# Patient Record
Sex: FEMALE | Race: WHITE | NOT HISPANIC OR LATINO | Employment: OTHER | URBAN - NONMETROPOLITAN AREA
[De-identification: names, ages, dates, MRNs, and addresses within clinical notes are randomized per-mention and may not be internally consistent; named-entity substitution may affect disease eponyms.]

---

## 2024-02-21 ENCOUNTER — HOSPITAL ENCOUNTER (INPATIENT)
Facility: HOSPITAL | Age: 79
LOS: 1 days | Discharge: HOME | DRG: 305 | End: 2024-02-22
Attending: EMERGENCY MEDICINE | Admitting: FAMILY MEDICINE
Payer: COMMERCIAL

## 2024-02-21 ENCOUNTER — APPOINTMENT (EMERGENCY)
Dept: RADIOLOGY | Facility: HOSPITAL | Age: 79
DRG: 305 | End: 2024-02-21
Payer: COMMERCIAL

## 2024-02-21 DIAGNOSIS — I20.0 UNSTABLE ANGINA (HCC): ICD-10-CM

## 2024-02-21 DIAGNOSIS — R07.9 CHEST PAIN: Primary | ICD-10-CM

## 2024-02-21 DIAGNOSIS — I44.7 LEFT BUNDLE BRANCH BLOCK: ICD-10-CM

## 2024-02-21 PROBLEM — I16.9 HYPERTENSIVE CRISIS: Status: ACTIVE | Noted: 2024-02-21

## 2024-02-21 LAB
2HR DELTA HS TROPONIN: -1 NG/L
4HR DELTA HS TROPONIN: 0 NG/L
ALBUMIN SERPL BCP-MCNC: 4.3 G/DL (ref 3.5–5)
ALP SERPL-CCNC: 111 U/L (ref 34–104)
ALT SERPL W P-5'-P-CCNC: 16 U/L (ref 7–52)
ANION GAP SERPL CALCULATED.3IONS-SCNC: 12 MMOL/L
AST SERPL W P-5'-P-CCNC: 20 U/L (ref 13–39)
BASOPHILS # BLD AUTO: 0.03 THOUSANDS/ÂΜL (ref 0–0.1)
BASOPHILS NFR BLD AUTO: 0 % (ref 0–1)
BILIRUB SERPL-MCNC: 0.57 MG/DL (ref 0.2–1)
BUN SERPL-MCNC: 23 MG/DL (ref 5–25)
CALCIUM SERPL-MCNC: 9.5 MG/DL (ref 8.4–10.2)
CARDIAC TROPONIN I PNL SERPL HS: 4 NG/L
CARDIAC TROPONIN I PNL SERPL HS: 5 NG/L
CARDIAC TROPONIN I PNL SERPL HS: 5 NG/L
CHLORIDE SERPL-SCNC: 104 MMOL/L (ref 96–108)
CO2 SERPL-SCNC: 24 MMOL/L (ref 21–32)
CREAT SERPL-MCNC: 1.03 MG/DL (ref 0.6–1.3)
EOSINOPHIL # BLD AUTO: 0.16 THOUSAND/ÂΜL (ref 0–0.61)
EOSINOPHIL NFR BLD AUTO: 2 % (ref 0–6)
ERYTHROCYTE [DISTWIDTH] IN BLOOD BY AUTOMATED COUNT: 14.1 % (ref 11.6–15.1)
GFR SERPL CREATININE-BSD FRML MDRD: 52 ML/MIN/1.73SQ M
GLUCOSE SERPL-MCNC: 103 MG/DL (ref 65–140)
HCT VFR BLD AUTO: 42.1 % (ref 34.8–46.1)
HGB BLD-MCNC: 13.8 G/DL (ref 11.5–15.4)
IMM GRANULOCYTES # BLD AUTO: 0.03 THOUSAND/UL (ref 0–0.2)
IMM GRANULOCYTES NFR BLD AUTO: 0 % (ref 0–2)
LIPASE SERPL-CCNC: 25 U/L (ref 11–82)
LYMPHOCYTES # BLD AUTO: 1.76 THOUSANDS/ÂΜL (ref 0.6–4.47)
LYMPHOCYTES NFR BLD AUTO: 22 % (ref 14–44)
MCH RBC QN AUTO: 28.3 PG (ref 26.8–34.3)
MCHC RBC AUTO-ENTMCNC: 32.8 G/DL (ref 31.4–37.4)
MCV RBC AUTO: 86 FL (ref 82–98)
MONOCYTES # BLD AUTO: 0.52 THOUSAND/ÂΜL (ref 0.17–1.22)
MONOCYTES NFR BLD AUTO: 7 % (ref 4–12)
NEUTROPHILS # BLD AUTO: 5.37 THOUSANDS/ÂΜL (ref 1.85–7.62)
NEUTS SEG NFR BLD AUTO: 69 % (ref 43–75)
NRBC BLD AUTO-RTO: 0 /100 WBCS
PLATELET # BLD AUTO: 285 THOUSANDS/UL (ref 149–390)
PMV BLD AUTO: 11 FL (ref 8.9–12.7)
POTASSIUM SERPL-SCNC: 4.1 MMOL/L (ref 3.5–5.3)
PROT SERPL-MCNC: 7.7 G/DL (ref 6.4–8.4)
RBC # BLD AUTO: 4.88 MILLION/UL (ref 3.81–5.12)
SODIUM SERPL-SCNC: 140 MMOL/L (ref 135–147)
WBC # BLD AUTO: 7.87 THOUSAND/UL (ref 4.31–10.16)

## 2024-02-21 PROCEDURE — 99223 1ST HOSP IP/OBS HIGH 75: CPT | Performed by: NURSE PRACTITIONER

## 2024-02-21 PROCEDURE — 99285 EMERGENCY DEPT VISIT HI MDM: CPT | Performed by: EMERGENCY MEDICINE

## 2024-02-21 PROCEDURE — 99285 EMERGENCY DEPT VISIT HI MDM: CPT

## 2024-02-21 PROCEDURE — 71045 X-RAY EXAM CHEST 1 VIEW: CPT

## 2024-02-21 PROCEDURE — 83690 ASSAY OF LIPASE: CPT | Performed by: NURSE PRACTITIONER

## 2024-02-21 PROCEDURE — 84484 ASSAY OF TROPONIN QUANT: CPT | Performed by: EMERGENCY MEDICINE

## 2024-02-21 PROCEDURE — 85025 COMPLETE CBC W/AUTO DIFF WBC: CPT | Performed by: EMERGENCY MEDICINE

## 2024-02-21 PROCEDURE — 36415 COLL VENOUS BLD VENIPUNCTURE: CPT | Performed by: EMERGENCY MEDICINE

## 2024-02-21 PROCEDURE — 93005 ELECTROCARDIOGRAM TRACING: CPT

## 2024-02-21 PROCEDURE — 80053 COMPREHEN METABOLIC PANEL: CPT | Performed by: EMERGENCY MEDICINE

## 2024-02-21 RX ORDER — NITROGLYCERIN 0.4 MG/1
0.4 TABLET SUBLINGUAL ONCE
Status: COMPLETED | OUTPATIENT
Start: 2024-02-21 | End: 2024-02-21

## 2024-02-21 RX ORDER — CYANOCOBALAMIN (VITAMIN B-12) 5000 MCG
1 TABLET,DISINTEGRATING ORAL DAILY
COMMUNITY

## 2024-02-21 RX ORDER — ASPIRIN 81 MG/1
324 TABLET, CHEWABLE ORAL ONCE
Status: COMPLETED | OUTPATIENT
Start: 2024-02-21 | End: 2024-02-21

## 2024-02-21 RX ORDER — HYDRALAZINE HYDROCHLORIDE 20 MG/ML
5 INJECTION INTRAMUSCULAR; INTRAVENOUS EVERY 6 HOURS PRN
Status: DISCONTINUED | OUTPATIENT
Start: 2024-02-21 | End: 2024-02-22 | Stop reason: HOSPADM

## 2024-02-21 RX ORDER — MULTIVIT-MIN/IRON/FOLIC ACID/K 18-600-40
1 CAPSULE ORAL DAILY
COMMUNITY

## 2024-02-21 RX ORDER — ASPIRIN 81 MG/1
81 TABLET, CHEWABLE ORAL DAILY
Status: DISCONTINUED | OUTPATIENT
Start: 2024-02-22 | End: 2024-02-22 | Stop reason: HOSPADM

## 2024-02-21 RX ORDER — ACETAMINOPHEN 325 MG/1
650 TABLET ORAL EVERY 6 HOURS PRN
Status: DISCONTINUED | OUTPATIENT
Start: 2024-02-21 | End: 2024-02-22 | Stop reason: HOSPADM

## 2024-02-21 RX ORDER — ENOXAPARIN SODIUM 100 MG/ML
40 INJECTION SUBCUTANEOUS DAILY
Status: DISCONTINUED | OUTPATIENT
Start: 2024-02-22 | End: 2024-02-22 | Stop reason: HOSPADM

## 2024-02-21 RX ADMIN — NITROGLYCERIN 0.4 MG: 0.4 TABLET SUBLINGUAL at 15:44

## 2024-02-21 RX ADMIN — ASPIRIN 81 MG CHEWABLE TABLET 324 MG: 81 TABLET CHEWABLE at 15:43

## 2024-02-21 NOTE — ED PROVIDER NOTES
History  Chief Complaint   Patient presents with    Chest Pain     Patient states she has been having palpitations, chest discomfort for one week. Was at PCP and instructed to come to ED for further evaluation of abnormal EKG.      Patient is a 78-year-old female with no history of diabetes, hypercholesterolemia or hypertension.  No smoking history.  Presenting to the emergency room with chief complaint of having about 1 week worth of chest discomfort.  Radiates occasionally to her neck and her left arm.  Sometimes radiates to her back.  Last for about 10 to 15 minutes and resolved spontaneously.  Mostly comes on at rest.  Does not initially come on with activity.  Patient has been active on a treadmill for some time and recently walked on the treadmill for about 15 minutes a day without any symptomatology.    Patient was following up with her primary care provider today and had an EKG in the office was that was computer read as abnormal and patient was sent to the emergency room for further evaluation.  Patient is denying any active chest pain at this time.  Patient denies any diaphoresis, no shortness of breath, no nausea.      Chest Pain  Pain location:  Substernal area  Pain radiates to:  L jaw, R jaw, upper back and L arm  Pain radiates to the back: yes    Pain severity:  Mild  Duration:  10 minutes  Timing:  Intermittent  Progression:  Waxing and waning  Context: not breathing    Associated symptoms: no abdominal pain, no anxiety, no diaphoresis, no dizziness, no dysphagia, no fatigue, no fever, no palpitations and no shortness of breath    Risk factors: no aortic disease, no coronary artery disease, no diabetes mellitus, no high cholesterol, no hypertension, no prior DVT/PE and no smoking        Prior to Admission Medications   Prescriptions Last Dose Informant Patient Reported? Taking?   Cyanocobalamin (Vitamin B-12) 5000 MCG TBDP   Yes Yes   Sig: Take 1 tablet by mouth in the morning   Vitamin D,  Cholecalciferol, 25 MCG (1000 UT) TABS   Yes Yes   Sig: Take 1 tablet by mouth daily      Facility-Administered Medications: None       History reviewed. No pertinent past medical history.    Past Surgical History:   Procedure Laterality Date    HYSTERECTOMY         History reviewed. No pertinent family history.  I have reviewed and agree with the history as documented.    E-Cigarette/Vaping    E-Cigarette Use Never User      E-Cigarette/Vaping Substances     Social History     Tobacco Use    Smoking status: Never    Smokeless tobacco: Never   Vaping Use    Vaping status: Never Used   Substance Use Topics    Alcohol use: Never    Drug use: Never       Review of Systems   Constitutional:  Negative for diaphoresis, fatigue and fever.   HENT:  Negative for trouble swallowing.    Respiratory:  Negative for shortness of breath and wheezing.    Cardiovascular:  Positive for chest pain. Negative for palpitations.   Gastrointestinal:  Negative for abdominal pain.   Neurological:  Negative for dizziness.   All other systems reviewed and are negative.      Physical Exam  Physical Exam  Vitals and nursing note reviewed.   Constitutional:       General: She is awake.      Appearance: Normal appearance. She is well-developed. She is not ill-appearing or toxic-appearing.   HENT:      Head: Normocephalic and atraumatic.      Right Ear: External ear normal.      Left Ear: External ear normal.      Nose: Nose normal.      Mouth/Throat:      Mouth: Mucous membranes are moist.   Eyes:      General: Lids are normal. No scleral icterus.     Extraocular Movements: Extraocular movements intact.      Pupils: Pupils are equal, round, and reactive to light.   Cardiovascular:      Rate and Rhythm: Normal rate and regular rhythm.      Heart sounds: Normal heart sounds. No murmur heard.  Pulmonary:      Effort: Pulmonary effort is normal. No respiratory distress.      Breath sounds: Normal breath sounds. No wheezing, rhonchi or rales.    Abdominal:      General: Abdomen is flat. There is no distension.      Palpations: Abdomen is soft.      Tenderness: There is no abdominal tenderness. There is no guarding or rebound.   Musculoskeletal:         General: No swelling, tenderness or deformity. Normal range of motion.      Cervical back: Normal range of motion and neck supple.   Skin:     General: Skin is warm and dry.      Coloration: Skin is not jaundiced or pale.      Findings: No rash.   Neurological:      Mental Status: She is alert and oriented to person, place, and time. Mental status is at baseline.      Cranial Nerves: No cranial nerve deficit.      Motor: No weakness.   Psychiatric:         Attention and Perception: Attention normal.         Mood and Affect: Mood normal.         Speech: Speech normal.         Behavior: Behavior normal.         Vital Signs  ED Triage Vitals [02/21/24 1357]   Temperature Pulse Respirations Blood Pressure SpO2   (!) 97.3 °F (36.3 °C) 85 18 (!) 201/100 98 %      Temp Source Heart Rate Source Patient Position - Orthostatic VS BP Location FiO2 (%)   Temporal Monitor Lying Left arm --      Pain Score       4           Vitals:    02/21/24 1357   BP: (!) 201/100   Pulse: 85   Patient Position - Orthostatic VS: Lying         Visual Acuity      ED Medications  Medications   nitroglycerin (NITROSTAT) SL tablet 0.4 mg (has no administration in time range)   aspirin chewable tablet 324 mg (has no administration in time range)       Diagnostic Studies  Results Reviewed       Procedure Component Value Units Date/Time    CBC and differential [[id number]] Collected: 02/21/24 1504    Lab Status: Final result Specimen: Blood from Arm, Right Updated: 02/21/24 1511     WBC 7.87 Thousand/uL      RBC 4.88 Million/uL      Hemoglobin 13.8 g/dL      Hematocrit 42.1 %      MCV 86 fL      MCH 28.3 pg      MCHC 32.8 g/dL      RDW 14.1 %      MPV 11.0 fL      Platelets 285 Thousands/uL      nRBC 0 /100 WBCs      Neutrophils Relative 69 %       Immat GRANS % 0 %      Lymphocytes Relative 22 %      Monocytes Relative 7 %      Eosinophils Relative 2 %      Basophils Relative 0 %      Neutrophils Absolute 5.37 Thousands/µL      Immature Grans Absolute 0.03 Thousand/uL      Lymphocytes Absolute 1.76 Thousands/µL      Monocytes Absolute 0.52 Thousand/µL      Eosinophils Absolute 0.16 Thousand/µL      Basophils Absolute 0.03 Thousands/µL     Comprehensive metabolic panel [[id number]] Collected: 02/21/24 1504    Lab Status: In process Specimen: Blood from Arm, Right Updated: 02/21/24 1509    HS Troponin 0hr (reflex protocol) [[id number]] Collected: 02/21/24 1504    Lab Status: In process Specimen: Blood from Arm, Right Updated: 02/21/24 1509                   XR chest 1 view portable    (Results Pending)              Procedures  ECG 12 Lead Documentation Only    Date/Time: 2/21/2024 3:17 PM    Performed by: Abel Hamilton DO  Authorized by: Abel Hamilton DO    ECG reviewed by me, the ED Provider: yes    Patient location:  ED  Previous ECG:     Previous ECG:  Unavailable  Interpretation:     Interpretation: normal    Rate:     ECG rate assessment: normal    Rhythm:     Rhythm: sinus rhythm    Ectopy:     Ectopy: none    QRS:     QRS axis:  Normal  Conduction:     Conduction: abnormal      Abnormal conduction: complete LBBB    ST segments:     ST segments:  Non-specific  T waves:     T waves: non-specific    Comments:      Hyperacute T waves           ED Course  ED Course as of 02/21/24 1518   Wed Feb 21, 2024   1515 Reviewed findings with patient.  Discussed same with her son who is present at bedside.  Will trend troponins.  Patient may require admission for stress test and echo.  Patient was signed out to Dr. Machado.                               SBIRT 22yo+      Flowsheet Row Most Recent Value   Initial Alcohol Screen: US AUDIT-C     1. How often do you have a drink containing alcohol? 0 Filed at: 02/21/2024 1445   2. How many drinks containing  alcohol do you have on a typical day you are drinking?  0 Filed at: 02/21/2024 1448   3b. FEMALE Any Age, or MALE 65+: How often do you have 4 or more drinks on one occassion? 0 Filed at: 02/21/2024 1448   Audit-C Score 0 Filed at: 02/21/2024 1448   JOSE CARLOS: How many times in the past year have you...    Used an illegal drug or used a prescription medication for non-medical reasons? Never Filed at: 02/21/2024 1448                      Medical Decision Making  Patient presented to the emergency department and a MSE was performed. The patient was evaluated for complaint related to acute chest pain.  Patient is potentially at risk for, but not limited to, acute coronary syndrome, arrhythmia, myocardial infarction, pulmonary embolism, pneumothorax, infectious process of the lungs such as pneumonia, GERD, esophagitis, muscle strain, or costochondritis.  Several of these diagnoses have been evaluated and ruled out by history and physical.  As needed, patient will be further evaluated with laboratory and imaging studies.  Higher level diagnostics, such as CT imaging or ultrasound, may also be required.  Please see work-up portion of the note for further evaluation of patient's risk.  Socioeconomic factors were also considered as part of the decision-making process.  Unless otherwise stated in the chart or patient is admitted as elsewhere documented, any previously prescribed medications will be maintained.        Amount and/or Complexity of Data Reviewed  Labs: ordered.  Radiology: ordered.             Disposition  Final diagnoses:   Chest pain     Time reflects when diagnosis was documented in both MDM as applicable and the Disposition within this note       Time User Action Codes Description Comment    2/21/2024  3:18 PM Abel Hamilton Add [R07.9] Chest pain           ED Disposition       None          Follow-up Information    None         Patient's Medications   Discharge Prescriptions    No medications on file       No  discharge procedures on file.    PDMP Review       None            ED Provider  Electronically Signed by             Abel Hamilton DO  02/21/24 1011

## 2024-02-21 NOTE — ED CARE HANDOFF
Emergency Department Sign Out Note        Sign out and transfer of care from Dr. Hamilton. See Separate Emergency Department note.     The patient, Zuri Gusman, was evaluated by the previous provider for chest pain.    Workup Completed:  Initial evaluation with initial lab work revealing negative troponin.  Pending repeat troponin and disposition decision.    ED Course / Workup Pending (followup):  Patient has remained hemodynamically stable and repeat troponin is negative.  However comparing patient's EKG from 2017 to today's EKG, there appears to be a new left bundle branch block.  The patient's symptoms are concerning for unstable angina.  In the setting of new left bundle, will admit for further cardiac workup.  Patient is agreeable with this plan.    XR chest 1 view portable    (Results Pending)     Results for orders placed or performed during the hospital encounter of 02/21/24   CBC and differential   Result Value Ref Range    WBC 7.87 4.31 - 10.16 Thousand/uL    RBC 4.88 3.81 - 5.12 Million/uL    Hemoglobin 13.8 11.5 - 15.4 g/dL    Hematocrit 42.1 34.8 - 46.1 %    MCV 86 82 - 98 fL    MCH 28.3 26.8 - 34.3 pg    MCHC 32.8 31.4 - 37.4 g/dL    RDW 14.1 11.6 - 15.1 %    MPV 11.0 8.9 - 12.7 fL    Platelets 285 149 - 390 Thousands/uL    nRBC 0 /100 WBCs    Neutrophils Relative 69 43 - 75 %    Immat GRANS % 0 0 - 2 %    Lymphocytes Relative 22 14 - 44 %    Monocytes Relative 7 4 - 12 %    Eosinophils Relative 2 0 - 6 %    Basophils Relative 0 0 - 1 %    Neutrophils Absolute 5.37 1.85 - 7.62 Thousands/µL    Immature Grans Absolute 0.03 0.00 - 0.20 Thousand/uL    Lymphocytes Absolute 1.76 0.60 - 4.47 Thousands/µL    Monocytes Absolute 0.52 0.17 - 1.22 Thousand/µL    Eosinophils Absolute 0.16 0.00 - 0.61 Thousand/µL    Basophils Absolute 0.03 0.00 - 0.10 Thousands/µL   Comprehensive metabolic panel   Result Value Ref Range    Sodium 140 135 - 147 mmol/L    Potassium 4.1 3.5 - 5.3 mmol/L    Chloride 104 96 - 108  "mmol/L    CO2 24 21 - 32 mmol/L    ANION GAP 12 mmol/L    BUN 23 5 - 25 mg/dL    Creatinine 1.03 0.60 - 1.30 mg/dL    Glucose 103 65 - 140 mg/dL    Calcium 9.5 8.4 - 10.2 mg/dL    AST 20 13 - 39 U/L    ALT 16 7 - 52 U/L    Alkaline Phosphatase 111 (H) 34 - 104 U/L    Total Protein 7.7 6.4 - 8.4 g/dL    Albumin 4.3 3.5 - 5.0 g/dL    Total Bilirubin 0.57 0.20 - 1.00 mg/dL    eGFR 52 ml/min/1.73sq m   HS Troponin 0hr (reflex protocol)   Result Value Ref Range    hs TnI 0hr 5 \"Refer to ACS Flowchart\"- see link ng/L   HS Troponin I 2hr   Result Value Ref Range    hs TnI 2hr 4 \"Refer to ACS Flowchart\"- see link ng/L    Delta 2hr hsTnI -1 <20 ng/L                                        Procedures  Medical Decision Making  Patient has remained hemodynamically stable and repeat troponin is negative.  However comparing patient's EKG from 2017 to today's EKG, there appears to be a new left bundle branch block.  The patient's symptoms are concerning for unstable angina.  In the setting of new left bundle, will admit for further cardiac workup.  Patient is agreeable with this plan.    Amount and/or Complexity of Data Reviewed  Labs: ordered.  Radiology: ordered.    Risk  OTC drugs.  Prescription drug management.            Disposition  Final diagnoses:   Chest pain   Left bundle branch block   Unstable angina (HCC)     Time reflects when diagnosis was documented in both MDM as applicable and the Disposition within this note       Time User Action Codes Description Comment    2/21/2024  3:18 PM Abel Hamilton Add [R07.9] Chest pain     2/21/2024  6:20 PM Micah Machado [I44.7] Left bundle branch block     2/21/2024  6:20 PM Micah Machado [I20.0] Unstable angina (HCC)           ED Disposition       ED Disposition   Admit    Condition   Stable    Date/Time   Wed Feb 21, 2024  6:20 PM    Comment                  Follow-up Information    None       Patient's Medications   Discharge Prescriptions    No medications on " file     No discharge procedures on file.       ED Provider  Electronically Signed by     Micah Machado MD  02/21/24 5490

## 2024-02-22 ENCOUNTER — APPOINTMENT (INPATIENT)
Dept: NON INVASIVE DIAGNOSTICS | Facility: HOSPITAL | Age: 79
DRG: 305 | End: 2024-02-22
Payer: COMMERCIAL

## 2024-02-22 VITALS
WEIGHT: 180.78 LBS | RESPIRATION RATE: 21 BRPM | DIASTOLIC BLOOD PRESSURE: 60 MMHG | HEART RATE: 75 BPM | HEIGHT: 64 IN | OXYGEN SATURATION: 95 % | SYSTOLIC BLOOD PRESSURE: 123 MMHG | BODY MASS INDEX: 30.86 KG/M2 | TEMPERATURE: 97.9 F

## 2024-02-22 LAB
ANION GAP SERPL CALCULATED.3IONS-SCNC: 8 MMOL/L
ATRIAL RATE: 76 BPM
BSA FOR ECHO PROCEDURE: 1.87 M2
BUN SERPL-MCNC: 19 MG/DL (ref 5–25)
CALCIUM SERPL-MCNC: 8.7 MG/DL (ref 8.4–10.2)
CHLORIDE SERPL-SCNC: 108 MMOL/L (ref 96–108)
CHOLEST SERPL-MCNC: 201 MG/DL
CO2 SERPL-SCNC: 22 MMOL/L (ref 21–32)
CREAT SERPL-MCNC: 0.84 MG/DL (ref 0.6–1.3)
ERYTHROCYTE [DISTWIDTH] IN BLOOD BY AUTOMATED COUNT: 14.3 % (ref 11.6–15.1)
GFR SERPL CREATININE-BSD FRML MDRD: 67 ML/MIN/1.73SQ M
GLUCOSE SERPL-MCNC: 110 MG/DL (ref 65–140)
HCT VFR BLD AUTO: 39 % (ref 34.8–46.1)
HDLC SERPL-MCNC: 65 MG/DL
HGB BLD-MCNC: 12.7 G/DL (ref 11.5–15.4)
LDLC SERPL CALC-MCNC: 123 MG/DL (ref 0–100)
MAGNESIUM SERPL-MCNC: 2.1 MG/DL (ref 1.9–2.7)
MCH RBC QN AUTO: 28.3 PG (ref 26.8–34.3)
MCHC RBC AUTO-ENTMCNC: 32.6 G/DL (ref 31.4–37.4)
MCV RBC AUTO: 87 FL (ref 82–98)
P AXIS: 68 DEGREES
PLATELET # BLD AUTO: 248 THOUSANDS/UL (ref 149–390)
PMV BLD AUTO: 11.1 FL (ref 8.9–12.7)
POTASSIUM SERPL-SCNC: 4.1 MMOL/L (ref 3.5–5.3)
PR INTERVAL: 162 MS
QRS AXIS: 22 DEGREES
QRSD INTERVAL: 126 MS
QT INTERVAL: 414 MS
QTC INTERVAL: 465 MS
RBC # BLD AUTO: 4.49 MILLION/UL (ref 3.81–5.12)
SODIUM SERPL-SCNC: 138 MMOL/L (ref 135–147)
T WAVE AXIS: 53 DEGREES
TRIGL SERPL-MCNC: 67 MG/DL
TSH SERPL DL<=0.05 MIU/L-ACNC: 2.89 UIU/ML (ref 0.45–4.5)
VENTRICULAR RATE: 76 BPM
WBC # BLD AUTO: 6.09 THOUSAND/UL (ref 4.31–10.16)

## 2024-02-22 PROCEDURE — 84443 ASSAY THYROID STIM HORMONE: CPT | Performed by: NURSE PRACTITIONER

## 2024-02-22 PROCEDURE — 83735 ASSAY OF MAGNESIUM: CPT | Performed by: NURSE PRACTITIONER

## 2024-02-22 PROCEDURE — 93306 TTE W/DOPPLER COMPLETE: CPT

## 2024-02-22 PROCEDURE — 99239 HOSP IP/OBS DSCHRG MGMT >30: CPT | Performed by: FAMILY MEDICINE

## 2024-02-22 PROCEDURE — 80048 BASIC METABOLIC PNL TOTAL CA: CPT | Performed by: NURSE PRACTITIONER

## 2024-02-22 PROCEDURE — 85027 COMPLETE CBC AUTOMATED: CPT | Performed by: NURSE PRACTITIONER

## 2024-02-22 PROCEDURE — 80061 LIPID PANEL: CPT | Performed by: NURSE PRACTITIONER

## 2024-02-22 RX ORDER — LOSARTAN POTASSIUM 25 MG/1
25 TABLET ORAL DAILY
Status: DISCONTINUED | OUTPATIENT
Start: 2024-02-22 | End: 2024-02-22 | Stop reason: HOSPADM

## 2024-02-22 RX ORDER — LOSARTAN POTASSIUM 25 MG/1
25 TABLET ORAL DAILY
Qty: 30 TABLET | Refills: 0 | Status: SHIPPED | OUTPATIENT
Start: 2024-02-23 | End: 2024-03-24

## 2024-02-22 RX ORDER — ASPIRIN 81 MG/1
81 TABLET, CHEWABLE ORAL DAILY
Start: 2024-02-23 | End: 2024-03-24

## 2024-02-22 RX ADMIN — LOSARTAN POTASSIUM 25 MG: 25 TABLET, FILM COATED ORAL at 09:30

## 2024-02-22 RX ADMIN — ASPIRIN 81 MG CHEWABLE TABLET 81 MG: 81 TABLET CHEWABLE at 09:30

## 2024-02-22 RX ADMIN — ENOXAPARIN SODIUM 40 MG: 40 INJECTION SUBCUTANEOUS at 09:30

## 2024-02-22 NOTE — DISCHARGE SUMMARY
[facility]  Discharge- Zuri Gusman 1945, 78 y.o. female MRN: [id number]  Unit/Bed#: -01 Encounter: [id number]  Primary Care Provider: Tima Currie MD   Date and time admitted to hospital: 2/21/2024  2:41 PM    * Chest pain  Assessment & Plan  MEMO 2  POA with palpitations and CP intermittently over the past week. Episodes last 10-15 minutes and sometimes radiate to neck, left arm or back. Not accompanied by diaphoresis, nausea or dyspnea. Not exertional.   Patient walks on the treadmill 1 mile every day and does not develop chest pain  Associated with hypertension in the ED, not relieved by SL NTG  EKG with LBBB, new finding  Full-strength aspirin given in ED, continue 81 mg daily  Troponins negative x 3  Check echocardiogram  Appreciate cardiology consultation  Monitor on telemetry  Lipid panel, TSH normal  Chest pain-free at time of medical admission    Hypertensive crisis  Assessment & Plan  /100 associated with chest pain  Improved blood pressure after SL NTG  Trend blood pressures  Antihypertensives as needed  Echocardiogram reviewed    Discharging Physician / Practitioner: Mare Hudson MD  PCP: Tima Currie MD  Admission Date:   Admission Orders (From admission, onward)       Ordered        02/21/24 1821  INPATIENT ADMISSION  Once                          Discharge Date: 02/22/24    Medical Problems       Resolved Problems  Date Reviewed: 2/22/2024   None         Consultations During Hospital Stay:  cardiology    Procedures Performed:   none    Significant Findings / Test Results:   XR chest 1 view portable    Result Date: 2/22/2024  Impression: No acute cardiopulmonary disease. Workstation performed: [id number]      Incidental Findings:   none    Test Results Pending at Discharge (will require follow up):   none     Outpatient Tests Requested:  Outpt follow up with cardiology    Complications:  none    Reason for Admission: Chest pain    Hospital Course:  "    Zuri Gusman is a 78 y.o. female patient who originally presented to the hospital on 2/21/2024 due to chest pain and palpitations intermittently over the past week.  Patient currently denies any chest pain or shortness of breath.  EKG shows a new left bundle branch block and troponins were negative.  Seen by cardiology and 2D echo ordered to evaluate wall motion.  Blood pressure was elevated on presentation and started on losartan 25 mg daily recommend outpatient blood pressure follow-up and follow-up with cardiology and PCP.  No arrhythmia noted on telemetry        Please see above list of diagnoses and related plan for additional information.     Condition at Discharge: good     Discharge Day Visit / Exam:     Subjective: Patient denies any chest pain or shortness of breath  Vitals: Blood Pressure: 123/60 (02/22/24 1307)  Pulse: 75 (02/22/24 1124)  Temperature: 97.9 °F (36.6 °C) (02/22/24 1124)  Temp Source: Temporal (02/21/24 1357)  Respirations: 21 (02/21/24 1915)  Height: 5' 4\" (162.6 cm) (02/21/24 2100)  Weight - Scale: 82 kg (180 lb 12.8 oz) (02/21/24 2100)  SpO2: 95 % (02/22/24 1124)  Exam:   Physical Exam  Vitals and nursing note reviewed.   Constitutional:       Appearance: Normal appearance.   HENT:      Head: Normocephalic and atraumatic.      Right Ear: External ear normal.      Left Ear: External ear normal.      Nose: Nose normal.      Mouth/Throat:      Pharynx: Oropharynx is clear.   Cardiovascular:      Rate and Rhythm: Normal rate and regular rhythm.      Heart sounds: Normal heart sounds.   Pulmonary:      Effort: Pulmonary effort is normal.      Breath sounds: Normal breath sounds.   Abdominal:      General: Bowel sounds are normal.      Palpations: Abdomen is soft.      Tenderness: There is no abdominal tenderness.   Musculoskeletal:         General: Normal range of motion.      Cervical back: Normal range of motion and neck supple.   Skin:     General: Skin is warm and dry.      " Capillary Refill: Capillary refill takes less than 2 seconds.   Neurological:      General: No focal deficit present.      Mental Status: She is alert and oriented to person, place, and time.   Psychiatric:         Mood and Affect: Mood normal.         Discussion with Family: none    Discharge instructions/Information to patient and family:   See after visit summary for information provided to patient and family.      Provisions for Follow-Up Care:  See after visit summary for information related to follow-up care and any pertinent home health orders.      Disposition:     Home    For Discharges to [facility]:   Not Applicable to this Patient - Not Applicable to this Patient    Planned Readmission: none     Discharge Statement:  I spent 35 minutes discharging the patient. This time was spent on the day of discharge. I had direct contact with the patient on the day of discharge. Greater than 50% of the total time was spent examining patient, answering all patient questions, arranging and discussing plan of care with patient as well as directly providing post-discharge instructions.  Additional time then spent on discharge activities.    Discharge Medications:  See after visit summary for reconciled discharge medications provided to patient and family.      ** Please Note: This note has been constructed using a voice recognition system **

## 2024-02-22 NOTE — ASSESSMENT & PLAN NOTE
MEMO 2  POA with palpitations and CP intermittently over the past week. Episodes last 10-15 minutes and sometimes radiate to neck, left arm or back. Not accompanied by diaphoresis, nausea or dyspnea. Not exertional.   Patient walks on the treadmill 1 mile every day and does not develop chest pain  Associated with hypertension in the ED, not relieved by SL NTG  EKG with LBBB, new finding  Full-strength aspirin given in ED, continue 81 mg daily  Troponins negative x 3  Check echocardiogram  Appreciate cardiology consultation  Monitor on telemetry  Lipid panel, TSH normal  Chest pain-free at time of medical admission

## 2024-02-22 NOTE — ASSESSMENT & PLAN NOTE
/100 associated with chest pain  Improved blood pressure after SL NTG  Trend blood pressures  Antihypertensives as needed  Echocardiogram pending

## 2024-02-22 NOTE — PLAN OF CARE
Problem: PAIN - ADULT  Goal: Verbalizes/displays adequate comfort level or baseline comfort level  Description: Interventions:  - Encourage patient to monitor pain and request assistance  - Assess pain using appropriate pain scale  - Administer analgesics based on type and severity of pain and evaluate response  - Implement non-pharmacological measures as appropriate and evaluate response  - Consider cultural and social influences on pain and pain management  - Notify physician/advanced practitioner if interventions unsuccessful or patient reports new pain  Outcome: Progressing     Problem: INFECTION - ADULT  Goal: Absence or prevention of progression during hospitalization  Description: INTERVENTIONS:  - Assess and monitor for signs and symptoms of infection  - Monitor lab/diagnostic results  - Monitor all insertion sites, i.e. indwelling lines, tubes, and drains  - Monitor endotracheal if appropriate and nasal secretions for changes in amount and color  - Mason appropriate cooling/warming therapies per order  - Administer medications as ordered  - Instruct and encourage patient and family to use good hand hygiene technique  - Identify and instruct in appropriate isolation precautions for identified infection/condition  Outcome: Progressing     Problem: SAFETY ADULT  Goal: Patient will remain free of falls  Description: INTERVENTIONS:  - Educate patient/family on patient safety including physical limitations  - Instruct patient to call for assistance with activity   - Consult OT/PT to assist with strengthening/mobility   - Keep Call bell within reach  - Keep bed low and locked with side rails adjusted as appropriate  - Keep care items and personal belongings within reach  - Initiate and maintain comfort rounds  Outcome: Progressing     Problem: DISCHARGE PLANNING  Goal: Discharge to home or other facility with appropriate resources  Description: INTERVENTIONS:  - Identify barriers to discharge w/patient and  Ok to change    caregiver  - Arrange for needed discharge resources and transportation as appropriate  - Identify discharge learning needs (meds, wound care, etc.)  - Arrange for interpretive services to assist at discharge as needed  - Refer to Case Management Department for coordinating discharge planning if the patient needs post-hospital services based on physician/advanced practitioner order or complex needs related to functional status, cognitive ability, or social support system  Outcome: Progressing     Problem: Knowledge Deficit  Goal: Patient/family/caregiver demonstrates understanding of disease process, treatment plan, medications, and discharge instructions  Description: Complete learning assessment and assess knowledge base.  Interventions:  - Provide teaching at level of understanding  - Provide teaching via preferred learning methods  Outcome: Progressing     Problem: CARDIOVASCULAR - ADULT  Goal: Maintains optimal cardiac output and hemodynamic stability  Description: INTERVENTIONS:  - Monitor I/O, vital signs and rhythm  - Monitor for S/S and trends of decreased cardiac output  - Administer and titrate ordered vasoactive medications to optimize hemodynamic stability  - Assess quality of pulses, skin color and temperature  - Assess for signs of decreased coronary artery perfusion  - Instruct patient to report change in severity of symptoms  Outcome: Progressing  Goal: Absence of cardiac dysrhythmias or at baseline rhythm  Description: INTERVENTIONS:  - Continuous cardiac monitoring, vital signs, obtain 12 lead EKG if ordered  - Administer antiarrhythmic and heart rate control medications as ordered  - Monitor electrolytes and administer replacement therapy as ordered  Outcome: Progressing

## 2024-02-22 NOTE — ASSESSMENT & PLAN NOTE
MEMO 2  POA with palpitations and CP intermittently over the past week. Episodes last 10-15 minutes and sometimes radiate to neck, left arm or back. Not accompanied by diaphoresis, nausea or dyspnea. Not exertional.   Patient walks on the treadmill 1 mile every day and does not develop chest pain  Associated with hypertension in the ED, not relieved by SL NTG  EKG with LBBB, new finding  Full-strength aspirin given in ED, continue 81 mg daily  Troponins negative x 3  Check echocardiogram  Appreciate cardiology consultation  Monitor on telemetry  Lipid panel, TSH pending  Chest pain-free at time of medical admission

## 2024-02-22 NOTE — UTILIZATION REVIEW
Initial Clinical Review    Admission: Date/Time/Statement:   Admission Orders (From admission, onward)       Ordered        02/21/24 1821  INPATIENT ADMISSION  Once                          Orders Placed This Encounter   Procedures    INPATIENT ADMISSION     Standing Status:   Standing     Number of Occurrences:   1     Order Specific Question:   Level of Care     Answer:   Med Surg [16]     Order Specific Question:   Estimated length of stay     Answer:   More than 2 Midnights     Order Specific Question:   Certification     Answer:   I certify that inpatient services are medically necessary for this patient for a duration of greater than two midnights. See H&P and MD Progress Notes for additional information about the patient's course of treatment.     ED Arrival Information       Expected   -    Arrival   2/21/2024 13:52    Acuity   Emergent              Means of arrival   Walk-In    Escorted by   Family Member    Service   Hospitalist    Admission type   Emergency              Arrival complaint   Chest Pain/Palpitations/Irregular EKG (sent from Dr. Currie's office)             Chief Complaint   Patient presents with    Chest Pain     Patient states she has been having palpitations, chest discomfort for one week. Was at PCP and instructed to come to ED for further evaluation of abnormal EKG.        Initial Presentation: 78 y.o. female to ED via walk-in from home   Present to ED with palpitations and CP intermittently over the past week. Episodes last 10-15 minutes and sometimes radiate to neck, left arm or back. Not accompanied by diaphoresis, nausea or dyspnea. Not exertional.  Patient states she is normally very active walking 1 mile on her treadmill every day.   PMHX: endometrial cancer status post hysterectomy   Admitted to [facility] with DX: Chest Pain  on exam: MEMO 2;  hypertension  - mproved blood pressure after SL NTG  ; tachypnea  EKG with LBBB, new finding   PLAN: cont asa; tele monitoring; f/u echo; f/u lipid  panel / TSH; monitor labs      Date: 2/22/24    Day 2  Discharge Summary    Hospital Course:      Zuri Gusman is a 78 y.o. female patient who originally presented to the hospital on 2/21/2024 due to chest pain and palpitations intermittently over the past week.  Patient currently denies any chest pain or shortness of breath.  EKG shows a new left bundle branch block and troponins were negative.  Seen by cardiology and 2D echo ordered to evaluate wall motion.  Blood pressure was elevated on presentation and started on losartan 25 mg daily recommend outpatient blood pressure follow-up and follow-up with cardiology and PCP.  No arrhythmia noted on telemetry    Disposition:  Home / self      ED Triage Vitals [02/21/24 1357]   Temperature Pulse Respirations Blood Pressure SpO2   (!) 97.3 °F (36.3 °C) 85 18 (!) 201/100 98 %      Temp Source Heart Rate Source Patient Position - Orthostatic VS BP Location FiO2 (%)   Temporal Monitor Lying Left arm --      Pain Score       4          Wt Readings from Last 1 Encounters:   02/22/24 82 kg (180 lb 12.4 oz)     Additional Vital Signs:   Date/Time Temp Pulse Resp BP MAP (mmHg) SpO2 O2 Device Patient Position - Orthostatic VS   02/22/24 1307 -- -- -- 123/60 -- -- -- --   02/22/24 11:24:24 97.9 °F (36.6 °C) 75 -- 123/60 81 95 % -- --   02/22/24 0900 -- -- -- -- -- -- None (Room air) --   02/22/24 06:56:30 97.3 °F (36.3 °C) Abnormal  67 -- 143/73 96 95 % -- --   02/22/24 03:06:32 97.5 °F (36.4 °C) 73 -- 145/76 99 93 % -- --   02/21/24 23:14:34 97.5 °F (36.4 °C) 79 -- 140/78 99 94 % -- --   02/21/24 2100 -- -- -- -- -- 95 % None (Room air) --   02/21/24 20:40:11 97.7 °F (36.5 °C) 77 -- 166/91 116 95 % -- --   02/21/24 1915 -- 63 21 181/77 Abnormal  111 97 % None (Room air) Lying   02/21/24 1700 -- 62 24 Abnormal  136/71 97 97 % None (Room air) Lying   02/21/24 1600 -- 74 19 149/73 105 96 % None (Room air) Lying   02/21/24 1357 97.3 °F (36.3 °C) Abnormal  85 18 201/100 Abnormal  --  98 % None (Room air) Lying       EKG: Normal sinus rhythm  Left bundle branch block  Abnormal ECG  No previous ECGs available      Pertinent Labs/Diagnostic Test Results:   XR chest 1 view portable   Final Result by Sorin Lomeli MD (02/22 0833)      No acute cardiopulmonary disease.            Workstation performed: [id number]              Results from last 7 days   Lab Units 02/22/24  0509 02/21/24  1504   WBC Thousand/uL 6.09 7.87   HEMOGLOBIN g/dL 12.7 13.8   HEMATOCRIT % 39.0 42.1   PLATELETS Thousands/uL 248 285   NEUTROS ABS Thousands/µL  --  5.37        Results from last 7 days   Lab Units 02/22/24  0509 02/21/24  1504   SODIUM mmol/L 138 140   POTASSIUM mmol/L 4.1 4.1   CHLORIDE mmol/L 108 104   CO2 mmol/L 22 24   ANION GAP mmol/L 8 12   BUN mg/dL 19 23   CREATININE mg/dL 0.84 1.03   EGFR ml/min/1.73sq m 67 52   CALCIUM mg/dL 8.7 9.5   MAGNESIUM mg/dL 2.1  --      Results from last 7 days   Lab Units 02/21/24  1504   AST U/L 20   ALT U/L 16   ALK PHOS U/L 111*   TOTAL PROTEIN g/dL 7.7   ALBUMIN g/dL 4.3   TOTAL BILIRUBIN mg/dL 0.57        Results from last 7 days   Lab Units 02/22/24  0509 02/21/24  1504   GLUCOSE RANDOM mg/dL 110 103           Results from last 7 days   Lab Units 02/21/24  1907 02/21/24  1716 02/21/24  1504   HS TNI 0HR ng/L  --   --  5   HS TNI 2HR ng/L  --  4  --    HSTNI D2 ng/L  --  -1  --    HS TNI 4HR ng/L 5  --   --    HSTNI D4 ng/L 0  --   --         Results from last 7 days   Lab Units 02/22/24  0509   TSH 3RD GENERATON uIU/mL 2.888        Results from last 7 days   Lab Units 02/21/24  1504   LIPASE u/L 25          ED Treatment:   Medication Administration from 02/21/2024 1344 to 02/21/2024 2020         Date/Time Order Dose Route Action     02/21/2024 1544 EST nitroglycerin (NITROSTAT) SL tablet 0.4 mg 0.4 mg Sublingual Given     02/21/2024 1543 EST aspirin chewable tablet 324 mg 324 mg Oral Given            Admitting Diagnosis: Unstable angina (HCC) [I20.0]  Left bundle branch  block [I44.7]  Chest pain [R07.9]    Age/Sex: 78 y.o. female    Admission Orders: SCDs; tele monitoring; cardiac diet    Scheduled Medications:  aspirin, 81 mg, Oral, Daily  enoxaparin, 40 mg, Subcutaneous, Daily  losartan, 25 mg, Oral, Daily      Continuous IV Infusions: None       PRN Meds:  acetaminophen, 650 mg, Oral, Q6H PRN  hydrALAZINE, 5 mg, Intravenous, Q6H PRN        IP CONSULT TO CARDIOLOGY    Network Utilization Review Department  ATTENTION: Please call with any questions or concerns to [phone] and carefully listen to the prompts so that you are directed to the right person. All voicemails are confidential.   For Discharge needs, contact Care Management DC Support Team at [phone] opt. 2  Send all requests for admission clinical reviews, approved or denied determinations and any other requests to dedicated fax number below belonging to the [facility] where the patient is receiving treatment. List of dedicated fax numbers for the Facilities:  FACILITY NAME UR FAX NUMBER   ADMISSION DENIALS (Administrative/Medical Necessity) [phone]   DISCHARGE SUPPORT TEAM (NETWORK) [phone]   PARENT CHILD HEALTH (Maternity/NICU/Pediatrics) [phone]   [facility] [phone]   [facility] [phone]   [facility] [phone]   [facility] [phone]   [facility] [phone]   [facility] [phone]   [facility] [phone]   [facility] [phone]   [facility] [phone]   formerly [facility] [phone]   [facility] [phone]   [facility] [phone]

## 2024-02-22 NOTE — H&P
[facility]  H&P  Name: Zuri Gusman 78 y.o. female I MRN: [id number]  Unit/Bed#: -01 I Date of Admission: 2/21/2024   Date of Service: 2/22/2024 I Hospital Day: 1      Assessment/Plan   * Chest pain  Assessment & Plan  MEMO 2  POA with palpitations and CP intermittently over the past week. Episodes last 10-15 minutes and sometimes radiate to neck, left arm or back. Not accompanied by diaphoresis, nausea or dyspnea. Not exertional.   Patient walks on the treadmill 1 mile every day and does not develop chest pain  Associated with hypertension in the ED, not relieved by SL NTG  EKG with LBBB, new finding  Full-strength aspirin given in ED, continue 81 mg daily  Troponins negative x 3  Check echocardiogram  Appreciate cardiology consultation  Monitor on telemetry  Lipid panel, TSH pending  Chest pain-free at time of medical admission    Hypertensive crisis  Assessment & Plan  /100 associated with chest pain  Improved blood pressure after SL NTG  Trend blood pressures  Antihypertensives as needed  Echocardiogram pending           VTE Pharmacologic Prophylaxis: VTE Score: 5 High Risk (Score >/= 5) - Pharmacological DVT Prophylaxis Ordered: enoxaparin (Lovenox). Sequential Compression Devices Ordered.  Code Status: Level 1 - Full Code   Discussion with family: Updated  (son) at bedside.    Anticipated Length of Stay: Patient will be admitted on an inpatient basis with an anticipated length of stay of greater than 2 midnights secondary to chest pain.    Total Time Spent on Date of Encounter in care of patient: 45 mins. This time was spent on one or more of the following: performing physical exam; counseling and coordination of care; obtaining or reviewing history; documenting in the medical record; reviewing/ordering tests, medications or procedures; communicating with other healthcare professionals and discussing with patient's family/caregivers.    Chief Complaint:  Chest pain    History of Present Illness:  Zuri Gusman is a 78 y.o. female with a PMH of endometrial cancer status post hysterectomy takes vitamin B and vitamin D.  Patient states she is normally very active walking 1 mile on her treadmill every day.  Today presents with chest pain from PCP office.  Relayed to her PCP intermittent chest pain over the past week sometimes radiating to her left arm or through to her back but not associated with nausea/diaphoresis/dyspnea and not exertional.  These episodes of chest pain happen while she is lying down and relaxing.    Review of Systems:  Review of Systems   Constitutional:  Negative for chills and fever.   HENT:  Negative for ear pain and sore throat.    Eyes:  Negative for pain and visual disturbance.   Respiratory:  Negative for cough and shortness of breath.    Cardiovascular:  Positive for chest pain. Negative for palpitations.   Gastrointestinal:  Negative for abdominal pain and vomiting.   Genitourinary:  Negative for dysuria and hematuria.   Musculoskeletal:  Positive for back pain. Negative for arthralgias.   Skin:  Negative for color change and rash.   Neurological:  Negative for seizures and syncope.   All other systems reviewed and are negative.      Past Medical and Surgical History:   Past Medical History:   Diagnosis Date    Endometrial cancer (HCC)        Past Surgical History:   Procedure Laterality Date    HYSTERECTOMY         Meds/Allergies:  Prior to Admission medications    Medication Sig Start Date End Date Taking? Authorizing Provider   Cyanocobalamin (Vitamin B-12) 5000 MCG TBDP Take 1 tablet by mouth in the morning   Yes Historical Provider, MD   Vitamin D, Cholecalciferol, 25 MCG (1000 UT) TABS Take 1 tablet by mouth daily   Yes Historical Provider, MD     I have reviewed home medications with patient personally.    Allergies:   Allergies   Allergen Reactions    Cephalexin Hives    Penicillin G Hives     Throat closes       Social  "History:  Marital Status:    Occupation: retired  Patient Pre-hospital Living Situation: Home  Patient Pre-hospital Level of Mobility: walks  Patient Pre-hospital Diet Restrictions: none  Substance Use History:   Social History     Substance and Sexual Activity   Alcohol Use Never     Social History     Tobacco Use   Smoking Status Never   Smokeless Tobacco Never     Social History     Substance and Sexual Activity   Drug Use Never       Family History:  History reviewed. No pertinent family history.    Physical Exam:     Vitals:   Blood Pressure: 140/78 (02/21/24 2314)  Pulse: 79 (02/21/24 2314)  Temperature: 97.5 °F (36.4 °C) (02/21/24 2314)  Temp Source: Temporal (02/21/24 1357)  Respirations: 21 (02/21/24 1915)  Height: 5' 4\" (162.6 cm) (02/21/24 2100)  Weight - Scale: 82 kg (180 lb 12.8 oz) (02/21/24 2100)  SpO2: 94 % (02/21/24 2314)    Physical Exam  Vitals and nursing note reviewed.   Constitutional:       General: She is not in acute distress.     Appearance: She is well-developed.   HENT:      Head: Normocephalic and atraumatic.      Mouth/Throat:      Mouth: Mucous membranes are dry.   Eyes:      Conjunctiva/sclera: Conjunctivae normal.   Cardiovascular:      Rate and Rhythm: Normal rate. Occasional Extrasystoles are present.     Heart sounds: No murmur heard.  Pulmonary:      Effort: Pulmonary effort is normal. No respiratory distress.      Breath sounds: Normal breath sounds.   Abdominal:      Palpations: Abdomen is soft.      Tenderness: There is no abdominal tenderness.   Musculoskeletal:         General: No swelling.      Cervical back: Neck supple.   Skin:     General: Skin is warm and dry.      Capillary Refill: Capillary refill takes less than 2 seconds.   Neurological:      General: No focal deficit present.      Mental Status: She is alert and oriented to person, place, and time.   Psychiatric:         Mood and Affect: Mood normal.         Behavior: Behavior normal.        Additional Data: "     Lab Results:  Results from last 7 days   Lab Units 02/21/24  1504   WBC Thousand/uL 7.87   HEMOGLOBIN g/dL 13.8   HEMATOCRIT % 42.1   PLATELETS Thousands/uL 285   NEUTROS PCT % 69   LYMPHS PCT % 22   MONOS PCT % 7   EOS PCT % 2     Results from last 7 days   Lab Units 02/21/24  1504   SODIUM mmol/L 140   POTASSIUM mmol/L 4.1   CHLORIDE mmol/L 104   CO2 mmol/L 24   BUN mg/dL 23   CREATININE mg/dL 1.03   ANION GAP mmol/L 12   CALCIUM mg/dL 9.5   ALBUMIN g/dL 4.3   TOTAL BILIRUBIN mg/dL 0.57   ALK PHOS U/L 111*   ALT U/L 16   AST U/L 20   GLUCOSE RANDOM mg/dL 103                       Lines/Drains:  Invasive Devices       Peripheral Intravenous Line  Duration             Peripheral IV 02/21/24 Dorsal (posterior);Right Wrist <1 day                        Imaging: Reviewed radiology reports from this admission including: chest xray  XR chest 1 view portable    (Results Pending)   No acute cardiopulmonary abnormality    EKG and Other Studies Reviewed on Admission:   EKG: no ST elevation or depression, there is left bundle branch block    ** Please Note: This note has been constructed using a voice recognition system. **

## 2024-02-22 NOTE — CONSULTS
"Consultation - Cardiology   Zuri Gusman 78 y.o. female MRN: [id number]  Unit/Bed#: -01 Encounter: [id number]    Assessment/Plan     Assessment:  Chest pain- palpable on exam   - EKG with new LBBB (resolved?)- repeat EKG pending   - echo pending   - troponin flat    - no significant cardiac risk factors other than likely HTN   HTN   Obesity     Plan:  Echo today  If normal can be discharged with OP stress testing  Continue asa for now  Use losartan 25 mg daily for BP control  Will also coordinate follow up with our practice to complete monitor for intermittent palpitations (no abnormalities on telemetry)     History of Present Illness   Physician Requesting Consult: Mare Hudson MD  Reason for Consult / Principal Problem: chest pain  HPI: Zuri Gusman is a 78 y.o. year old female with history of endometrial cancer is here for cp. She states this started in the last week. She states this bothers her when she is rest occurring in the left chest and can radiate to her back and down the left arm. At it's worst it was 3/10. When she exerts herself she has no chest discomfort. She walks on the treadmill and has no symptoms. She uses her treadmill 1-2 times a day and normally walks for about 15 minutes at a time. When she walks on the treadmill she does develop SOB but no chest pain. She was found to be hypertensive on admission and given nitro. This did not take her pain away. Her EKG showed a LBBB which appears new. She says that her pain is worse when she touches her chest. Troponin negative x 3. She does also report some palpitations that have since resolved.     She is a non smoker  She says her mother had a pacemaker when she was 88  Her dad had \"angina\".     Inpatient consult to Cardiology  Consult performed by: Sun Layne PA-C  Consult ordered by: KEM Salgado        Review of Systems   Constitutional:  Negative for chills, diaphoresis, fatigue and unexpected weight change. " "  Respiratory:  Positive for chest tightness and shortness of breath.    Cardiovascular:  Positive for chest pain and palpitations. Negative for leg swelling.   Gastrointestinal:  Negative for nausea.   Skin:  Negative for color change.   Neurological:  Negative for dizziness and weakness.   Psychiatric/Behavioral:  Negative for agitation.        Historical Information   Past Medical History:   Diagnosis Date    Endometrial cancer (HCC)      Past Surgical History:   Procedure Laterality Date    HYSTERECTOMY       Social History     Substance and Sexual Activity   Alcohol Use Never     Social History     Substance and Sexual Activity   Drug Use Never     E-Cigarette/Vaping    E-Cigarette Use Never User      E-Cigarette/Vaping Substances     Social History     Tobacco Use   Smoking Status Never   Smokeless Tobacco Never     Family History: non-contributory    Meds/Allergies   all current active meds have been reviewed  Allergies   Allergen Reactions    Cephalexin Hives    Penicillin G Hives     Throat closes       Objective   Vitals: Blood pressure 143/73, pulse 67, temperature (!) 97.3 °F (36.3 °C), resp. rate 21, height 5' 4\" (1.626 m), weight 82 kg (180 lb 12.8 oz), SpO2 95%.  Orthostatic Blood Pressures      Flowsheet Row Most Recent Value   Blood Pressure 143/73 filed at 02/22/2024 0656   Patient Position - Orthostatic VS Lying filed at 02/21/2024 1915            No intake or output data in the 24 hours ending 02/22/24 0854    Invasive Devices       Peripheral Intravenous Line  Duration             Peripheral IV 02/21/24 Dorsal (posterior);Right Wrist <1 day                    Physical Exam  Vitals and nursing note reviewed.   Constitutional:       Appearance: Normal appearance.   HENT:      Head: Normocephalic and atraumatic.   Cardiovascular:      Rate and Rhythm: Normal rate.      Heart sounds: No murmur heard.  Pulmonary:      Effort: Pulmonary effort is normal.      Breath sounds: No wheezing.   Abdominal: "      Palpations: Abdomen is soft.   Musculoskeletal:         General: No swelling.      Cervical back: Neck supple.   Skin:     General: Skin is warm and dry.      Capillary Refill: Capillary refill takes less than 2 seconds.   Neurological:      General: No focal deficit present.      Mental Status: She is alert and oriented to person, place, and time.   Psychiatric:         Mood and Affect: Mood normal.         Thought Content: Thought content normal.         Lab Results: I have personally reviewed pertinent lab results.    CBC with diff:   Results from last 7 days   Lab Units 02/22/24  0509   WBC Thousand/uL 6.09   RBC Million/uL 4.49   HEMOGLOBIN g/dL 12.7   HEMATOCRIT % 39.0   MCV fL 87   MCH pg 28.3   MCHC g/dL 32.6   RDW % 14.3   MPV fL 11.1   PLATELETS Thousands/uL 248     CMP:   Results from last 7 days   Lab Units 02/22/24  0509 02/21/24  1504   SODIUM mmol/L 138 140   CHLORIDE mmol/L 108 104   CO2 mmol/L 22 24   BUN mg/dL 19 23   CREATININE mg/dL 0.84 1.03   CALCIUM mg/dL 8.7 9.5   AST U/L  --  20   ALT U/L  --  16   ALK PHOS U/L  --  111*   EGFR ml/min/1.73sq m 67 52     HS Troponin:   0   Lab Value Date/Time    HSTNI0 5 02/21/2024 1504    HSTNI2 4 02/21/2024 1716    HSTNI4 5 02/21/2024 1907     BNP:   Results from last 7 days   Lab Units 02/22/24  0509   POTASSIUM mmol/L 4.1   CHLORIDE mmol/L 108   CO2 mmol/L 22   BUN mg/dL 19   CREATININE mg/dL 0.84   CALCIUM mg/dL 8.7   EGFR ml/min/1.73sq m 67     Coags:     TSH:   Results from last 7 days   Lab Units 02/22/24  0509   TSH 3RD GENERATON uIU/mL 2.888     Magnesium:   Results from last 7 days   Lab Units 02/22/24  0509   MAGNESIUM mg/dL 2.1     Lipid Profile:   Results from last 7 days   Lab Units 02/22/24  0509   HDL mg/dL 65   LDL CALC mg/dL 123*   TRIGLYCERIDES mg/dL 67     Imaging: I have personally reviewed pertinent reports.    EKG: lbbb   Only prior EKG is from 2016 but did not note LBBB

## 2024-02-22 NOTE — PLAN OF CARE
Problem: PAIN - ADULT  Goal: Verbalizes/displays adequate comfort level or baseline comfort level  Description: Interventions:  - Encourage patient to monitor pain and request assistance  - Assess pain using appropriate pain scale  - Administer analgesics based on type and severity of pain and evaluate response  - Implement non-pharmacological measures as appropriate and evaluate response  - Consider cultural and social influences on pain and pain management  - Notify physician/advanced practitioner if interventions unsuccessful or patient reports new pain  Outcome: Progressing     Problem: INFECTION - ADULT  Goal: Absence or prevention of progression during hospitalization  Description: INTERVENTIONS:  - Assess and monitor for signs and symptoms of infection  - Monitor lab/diagnostic results  - Monitor all insertion sites, i.e. indwelling lines, tubes, and drains  - Monitor endotracheal if appropriate and nasal secretions for changes in amount and color  - [place] appropriate cooling/warming therapies per order  - Administer medications as ordered  - Instruct and encourage patient and family to use good hand hygiene technique  - Identify and instruct in appropriate isolation precautions for identified infection/condition  Outcome: Progressing     Problem: SAFETY ADULT  Goal: Patient will remain free of falls  Description: INTERVENTIONS:  - Educate patient/family on patient safety including physical limitations  - Instruct patient to call for assistance with activity   - Consult OT/PT to assist with strengthening/mobility   - Keep Call bell within reach  - Keep bed low and locked with side rails adjusted as appropriate  - Keep care items and personal belongings within reach  - Initiate and maintain comfort rounds  - Make Fall Risk Sign visible to staff  - Offer Toileting every 2 Hours, in advance of need  - Initiate/Maintain bed/chair alarm  - Obtain necessary fall risk management equipment:   - Apply yellow  socks and bracelet for high fall risk patients  - Consider moving patient to room near nurses station  Outcome: Progressing     Problem: DISCHARGE PLANNING  Goal: Discharge to home or other facility with appropriate resources  Description: INTERVENTIONS:  - Identify barriers to discharge w/patient and caregiver  - Arrange for needed discharge resources and transportation as appropriate  - Identify discharge learning needs (meds, wound care, etc.)  - Arrange for interpretive services to assist at discharge as needed  - Refer to Case Management Department for coordinating discharge planning if the patient needs post-hospital services based on physician/advanced practitioner order or complex needs related to functional status, cognitive ability, or social support system  Outcome: Progressing     Problem: Knowledge Deficit  Goal: Patient/family/caregiver demonstrates understanding of disease process, treatment plan, medications, and discharge instructions  Description: Complete learning assessment and assess knowledge base.  Interventions:  - Provide teaching at level of understanding  - Provide teaching via preferred learning methods  Outcome: Progressing     Problem: CARDIOVASCULAR - ADULT  Goal: Maintains optimal cardiac output and hemodynamic stability  Description: INTERVENTIONS:  - Monitor I/O, vital signs and rhythm  - Monitor for S/S and trends of decreased cardiac output  - Administer and titrate ordered vasoactive medications to optimize hemodynamic stability  - Assess quality of pulses, skin color and temperature  - Assess for signs of decreased coronary artery perfusion  - Instruct patient to report change in severity of symptoms  Outcome: Progressing  Goal: Absence of cardiac dysrhythmias or at baseline rhythm  Description: INTERVENTIONS:  - Continuous cardiac monitoring, vital signs, obtain 12 lead EKG if ordered  - Administer antiarrhythmic and heart rate control medications as ordered  - Monitor  electrolytes and administer replacement therapy as ordered  Outcome: Progressing

## 2024-02-22 NOTE — ASSESSMENT & PLAN NOTE
/100 associated with chest pain  Improved blood pressure after SL NTG  Trend blood pressures  Antihypertensives as needed  Echocardiogram reviewed

## 2024-02-23 NOTE — UTILIZATION REVIEW
NOTIFICATION OF ADMISSION DISCHARGE   This is a Notification of Discharge from [facility]. Please be advised that this patient has been discharge from our facility. Below you will find the admission and discharge date and time including the patient’s disposition.   UTILIZATION REVIEW CONTACT:  Breana Lai  Utilization   Network Utilization Review Department  Phone: [phone] x carefully listen to the prompts. All voicemails are confidential.  Email: [email or web address]     ADMISSION INFORMATION  PRESENTATION DATE: 2/21/2024  2:41 PM  OBERVATION ADMISSION DATE:   INPATIENT ADMISSION DATE: 2/21/24  6:21 PM   DISCHARGE DATE: 2/22/2024  4:02 PM   DISPOSITION:Home/Self Care    Network Utilization Review Department  ATTENTION: Please call with any questions or concerns to [phone] and carefully listen to the prompts so that you are directed to the right person. All voicemails are confidential.   For Discharge needs, contact Care Management DC Support Team at [phone] opt. 2  Send all requests for admission clinical reviews, approved or denied determinations and any other requests to dedicated fax number below belonging to the campus where the patient is receiving treatment. List of dedicated fax numbers for the Facilities:  FACILITY NAME UR FAX NUMBER   ADMISSION DENIALS (Administrative/Medical Necessity) [phone]   DISCHARGE SUPPORT TEAM ([facility]) [phone]   PARENT CHILD HEALTH (Maternity/NICU/Pediatrics) [phone]   [facility] [phone]   [facility] [phone]   Critical access hospital [phone]   [facility] [phone]   [facility] [phone]   [facility] [phone]   [facility] [phone]   [facility]  [phone]   [facility] [phone]   [facility] [phone]   [facility] [phone]   [facility] [phone]

## 2024-03-22 RX ORDER — NAPROXEN SODIUM 220 MG
220 TABLET ORAL EVERY 12 HOURS PRN
COMMUNITY

## 2024-03-27 ENCOUNTER — OFFICE VISIT (OUTPATIENT)
Dept: CARDIOLOGY CLINIC | Facility: CLINIC | Age: 79
End: 2024-03-27
Payer: COMMERCIAL

## 2024-03-27 VITALS
SYSTOLIC BLOOD PRESSURE: 148 MMHG | DIASTOLIC BLOOD PRESSURE: 82 MMHG | WEIGHT: 182 LBS | HEIGHT: 64 IN | BODY MASS INDEX: 31.07 KG/M2 | HEART RATE: 64 BPM | OXYGEN SATURATION: 99 %

## 2024-03-27 DIAGNOSIS — R07.2 PRECORDIAL PAIN: Primary | ICD-10-CM

## 2024-03-27 DIAGNOSIS — I44.7 LBBB (LEFT BUNDLE BRANCH BLOCK): ICD-10-CM

## 2024-03-27 DIAGNOSIS — R00.2 PALPITATION: ICD-10-CM

## 2024-03-27 DIAGNOSIS — I10 PRIMARY HYPERTENSION: ICD-10-CM

## 2024-03-27 PROCEDURE — 99204 OFFICE O/P NEW MOD 45 MIN: CPT | Performed by: STUDENT IN AN ORGANIZED HEALTH CARE EDUCATION/TRAINING PROGRAM

## 2024-03-27 RX ORDER — AMLODIPINE BESYLATE 5 MG/1
5 TABLET ORAL DAILY
Qty: 30 TABLET | Refills: 3 | Status: SHIPPED | OUTPATIENT
Start: 2024-03-27

## 2024-03-27 NOTE — PROGRESS NOTES
[facility] [place]  [address]  [address]  [facility] [address]  Phone#  [phone]  Fax#  [phone]  [facility] Cardiology Office New Patient Visit             NAME: Zuri Gusman  AGE: 78 y.o. SEX: female   : 1945   MRN: [id number]  Assessment and plan:  1. Precordial pain  -     NM myocardial perfusion spect (rx stress and/or rest); Future; Expected date: 2024    2. Primary hypertension  -     amLODIPine (NORVASC) 5 mg tablet; Take 1 tablet (5 mg total) by mouth daily    3. LBBB (left bundle branch block)    4. Palpitation    -f/u pharm nuclear stress test due to presence of LBBB.   -BP logs today showed still elevated numbers (140-150s mmHg), added amlodipine 5 mg qd in addition to continuing losartan 25 gm qd. Nurse visit in 1 month for BP check. Her PCP have blood work done few day ago, she will send us the copy to f/u on Cr.  - reports improvement in palpitations. If recurrence then will proceed with heart monitor.  -Lipi panel  LDL -123, ASCVD 36.8% high risk, she is not interested in statins. Encouraged to continue with daily exercises and heart heathy diet.    RTC 1 month for nurse visit for BP check  RTC in 2 month      Chief Complaint   Patient presents with    New Patient Visit     HPI:  Zuri Gusman is a 78 y.o. female who presents to the cardiology clinic for management of chest pain. She was admiited with chest pain in 2024, had TTE with no abnormalities, discharged with outpatient follow up for further work up. Chest pain happened in February, has been going on for 1 week prior to presenting to ER , transient, not associated with exertion, radiated to left arm. She was seen  by PCP and had EKG done and it was abnormal thus was sent to ER. In ER was noted to have SBP in 200s, troponins negative,EKG with new LBBB. Admits to the improvement in the frequency of the chest pain but still has it. She walks on treadmil daily, 15 min  , 3-4 incline, no chest pain. Some shortness of breath at the end of exercise. At times when she had chest pain also had palpitations but nor recurrence of palpitations for about 1 month. She was started on losartan 25 mg qd while in hospital and brouight her BP logs with her. SBP in 140-150s, DBP in mid 80s.     Review of Systems denies leg swelling, orthopnea, paroxysmal exertional dyspnea or syncope.      SoxHx: never smoker, occasional alcohol intake, denies illicit drug use  Fhx:  mother had pacemaker at age of 87 (not sure of etiology), father had CAD    Past history, family history, social history, current medications, vital signs, recent lab and imaging studies and  prior cardiology studies reviewed independently on this visit.    Allergies   Allergen Reactions    Cephalexin Hives    Penicillin G Hives     Throat closes     Current Outpatient Medications:     amLODIPine (NORVASC) 5 mg tablet, Take 1 tablet (5 mg total) by mouth daily, Disp: 30 tablet, Rfl: 3    aspirin 81 mg chewable tablet, Chew 1 tablet (81 mg total) daily, Disp: , Rfl:     Cyanocobalamin (Vitamin B-12) 5000 MCG TBDP, Take 1 tablet by mouth in the morning, Disp: , Rfl:     losartan (COZAAR) 25 mg tablet, Take 1 tablet (25 mg total) by mouth daily, Disp: 30 tablet, Rfl: 0    naproxen sodium (ALEVE) 220 MG tablet, Take 220 mg by mouth every 12 (twelve) hours as needed, Disp: , Rfl:     Vitamin D, Cholecalciferol, 25 MCG (1000 UT) TABS, Take 1 tablet by mouth daily, Disp: , Rfl:     Objective:   Vitals:    03/27/24 1326   BP: 148/82   Pulse: 64   SpO2: 99%     Wt Readings from Last 3 Encounters:   03/27/24 82.6 kg (182 lb)   02/22/24 82 kg (180 lb 12.4 oz)     Pulse Readings from Last 3 Encounters:   03/27/24 64   02/22/24 75     BP Readings from Last 3 Encounters:   03/27/24 148/82   02/22/24 123/60     Physical Exam  General Appearance:    Alert, cooperative, no distress, appears stated age   Head:    atraumatic   Neck:   Supple,  no  "carotid  bruit or JVD   Lungs:     Clear to auscultation bilaterally, respirations unlabored   Chest wall:    No tenderness or deformity   Heart:    Regular rate and rhythm, S1 and S2 normal, no murmur, rub    or gallop   Abdomen:     Soft, non-tender, no organomegaly   Extremities:   Extremities no cyanosis or edema   Skin:   Skin color, texture, turgor normal, no rashes or lesions      Pertinent Laboratory/Diagnostic Studies:    Laboratory studies reviewed personally by Zaina García DO    BMP:   Lab Results   Component Value Date    SODIUM 138 02/22/2024    K 4.1 02/22/2024     02/22/2024    CO2 22 02/22/2024    BUN 19 02/22/2024    CREATININE 0.84 02/22/2024    GLUC 110 02/22/2024    CALCIUM 8.7 02/22/2024     CBC:  Lab Results   Component Value Date    WBC 6.09 02/22/2024    HGB 12.7 02/22/2024    HCT 39.0 02/22/2024    MCV 87 02/22/2024     02/22/2024     Lipid Profile:   Lab Results   Component Value Date    HDL 65 02/22/2024     Lab Results   Component Value Date    LDLCALC 123 (H) 02/22/2024     Lab Results   Component Value Date    TRIG 67 02/22/2024      Other labs:  Lab Results   Component Value Date    VZI4YMMYSKLR 2.888 02/22/2024     Lab Results   Component Value Date    ALT 16 02/21/2024    AST 20 02/21/2024     Imaging Studies:  Pertinent imaging studies and cardiac studies were independently reviewed on this visit and findings summarized.    Zaina García DO  Portions of the record may have been created with voice recognition software.  Occasional wrong word or \"sound alike\" substitutions may have occurred due to the inherent limitations of voice recognition software.  Read the chart carefully and recognize, using context, where substitutions have occurred. Please reach out to me directly for any clarifications.   "

## 2024-04-30 ENCOUNTER — HOSPITAL ENCOUNTER (OUTPATIENT)
Dept: NON INVASIVE DIAGNOSTICS | Facility: HOSPITAL | Age: 79
Discharge: HOME | End: 2024-04-30
Attending: STUDENT IN AN ORGANIZED HEALTH CARE EDUCATION/TRAINING PROGRAM
Payer: COMMERCIAL

## 2024-04-30 ENCOUNTER — HOSPITAL ENCOUNTER (OUTPATIENT)
Dept: NUCLEAR MEDICINE | Facility: HOSPITAL | Age: 79
Discharge: HOME | End: 2024-04-30
Attending: STUDENT IN AN ORGANIZED HEALTH CARE EDUCATION/TRAINING PROGRAM
Payer: COMMERCIAL

## 2024-04-30 DIAGNOSIS — R07.2 PRECORDIAL PAIN: ICD-10-CM

## 2024-04-30 LAB
MAX HR: 98 BPM
NUC STRESS EJECTION FRACTION: 78 %
RATE PRESSURE PRODUCT: NORMAL
SL CV REST NUCLEAR ISOTOPE DOSE: 10.7 MCI
SL CV STRESS NUCLEAR ISOTOPE DOSE: 33 MCI
SL CV STRESS RECOVERY BP: NORMAL MMHG
SL CV STRESS RECOVERY HR: 85 BPM
SL CV STRESS RECOVERY O2 SAT: 98 %
STRESS ANGINA INDEX: 0
STRESS BASELINE BP: NORMAL MMHG
STRESS BASELINE HR: 60 BPM
STRESS O2 SAT REST: 99 %
STRESS PEAK HR: 98 BPM
STRESS POST EXERCISE DUR MIN: 3 MIN
STRESS POST EXERCISE DUR SEC: 0 SEC
STRESS POST O2 SAT PEAK: 99 %
STRESS POST PEAK BP: 136 MMHG
STRESS/REST PERFUSION RATIO: 1.05

## 2024-04-30 PROCEDURE — 93017 CV STRESS TEST TRACING ONLY: CPT

## 2024-04-30 PROCEDURE — 78452 HT MUSCLE IMAGE SPECT MULT: CPT

## 2024-04-30 PROCEDURE — A9502 TC99M TETROFOSMIN: HCPCS

## 2024-04-30 RX ORDER — REGADENOSON 0.08 MG/ML
0.4 INJECTION, SOLUTION INTRAVENOUS ONCE
Status: COMPLETED | OUTPATIENT
Start: 2024-04-30 | End: 2024-04-30

## 2024-04-30 RX ADMIN — REGADENOSON 0.4 MG: 0.08 INJECTION, SOLUTION INTRAVENOUS at 09:15

## 2024-05-07 LAB
CHEST PAIN STATEMENT: NORMAL
MAX DIASTOLIC BP: 80 MMHG
MAX PREDICTED HEART RATE: 143 BPM
PROTOCOL NAME: NORMAL
REASON FOR TERMINATION: NORMAL
STRESS POST EXERCISE DUR MIN: 3 MIN
STRESS POST EXERCISE DUR SEC: 0 SEC
STRESS POST PEAK HR: 98 BPM
STRESS POST PEAK SYSTOLIC BP: 140 MMHG
TARGET HR FORMULA: NORMAL
TEST INDICATION: NORMAL

## 2024-05-28 ENCOUNTER — OFFICE VISIT (OUTPATIENT)
Dept: CARDIOLOGY CLINIC | Facility: CLINIC | Age: 79
End: 2024-05-28
Payer: COMMERCIAL

## 2024-05-28 VITALS
HEIGHT: 64 IN | DIASTOLIC BLOOD PRESSURE: 90 MMHG | BODY MASS INDEX: 31.58 KG/M2 | WEIGHT: 185 LBS | HEART RATE: 90 BPM | OXYGEN SATURATION: 96 % | SYSTOLIC BLOOD PRESSURE: 158 MMHG

## 2024-05-28 DIAGNOSIS — I44.7 LBBB (LEFT BUNDLE BRANCH BLOCK): ICD-10-CM

## 2024-05-28 DIAGNOSIS — E78.00 PURE HYPERCHOLESTEROLEMIA: ICD-10-CM

## 2024-05-28 DIAGNOSIS — I10 PRIMARY HYPERTENSION: ICD-10-CM

## 2024-05-28 DIAGNOSIS — R07.2 PRECORDIAL PAIN: Primary | ICD-10-CM

## 2024-05-28 DIAGNOSIS — R00.2 PALPITATION: ICD-10-CM

## 2024-05-28 PROCEDURE — 99214 OFFICE O/P EST MOD 30 MIN: CPT | Performed by: STUDENT IN AN ORGANIZED HEALTH CARE EDUCATION/TRAINING PROGRAM

## 2024-05-28 RX ORDER — LOSARTAN POTASSIUM 25 MG/1
25 TABLET ORAL DAILY
Start: 2024-05-28 | End: 2032-01-17

## 2024-05-28 RX ORDER — CHLORTHALIDONE 25 MG/1
25 TABLET ORAL DAILY
Qty: 30 TABLET | Refills: 3 | Status: SHIPPED | OUTPATIENT
Start: 2024-05-28

## 2024-05-28 RX ORDER — ASPIRIN 81 MG/1
81 TABLET, CHEWABLE ORAL DAILY
Start: 2024-05-28 | End: 2024-06-27

## 2024-05-28 NOTE — PROGRESS NOTES
"[facility] [place] [address]  [address]  [facility] [address]  Phone#  [phone]  Fax#  [phone]  [facility] Cardiology Office Follow-up Visit             NAME: Zuri Gusman  AGE: 78 y.o. SEX: female   : 1945   MRN: [id number]  Assessment and plan:  1. Precordial pain  -     aspirin 81 mg chewable tablet; Chew 1 tablet (81 mg total) daily  2. Primary hypertension  -     chlorthalidone 25 mg tablet; Take 1 tablet (25 mg total) by mouth daily  -     losartan (COZAAR) 25 mg tablet; Take 1 tablet (25 mg total) by mouth daily  3. LBBB (left bundle branch block)  4. Palpitation  5. Pure hypercholesterolemia    -2024: pharm nuclear stress test: 78% ef, LF perfusion normal. TTE 2024 with LVEF 64%, normal wall motions. Chest pain possibly was due to poorly controlled blood pressure. No recurrence.  -Although BP in office is elevated, BP at nuclear stress test was at goal and home logs today showed improvement in BP. Will continue losartan 25 mg qd (Labs from 3/2024 are reviewd, Cr and ptassium are within nromal range). In view of ankle edema that started after being on amlodipne, will switch to chlorthalidone 25 mg qd. She will let us know BP next week for further adjustement of chlorthalidone dosage. Once stable dose is found, then will do BMP.  - reports improvement in palpitations. If recurrence then will proceed with heart monitor.  -Lipid panel 2024 LDL -123, ASCVD 36.8% high risk, she is not interested in statins or any cholesterol lowering therapy. Encouraged to continue with daily exercises and heart heathy diet.    RTC 6 month      Cc: \"follow up on cardiac problems\"    HPI:  3/27/2024: Zuri Gusman is a 78 y.o. female who presents to the cardiology clinic for management of chest pain. She was admiited with chest pain in 2024, had TTE with no abnormalities, discharged with outpatient follow up for further work up. Chest pain happened in " February, has been going on for 1 week prior to presenting to ER , transient, not associated with exertion, radiated to left arm. She was seen  by PCP and had EKG done and it was abnormal thus was sent to ER. In ER was noted to have SBP in 200s, troponins negative, EKG with new LBBB. Admits to the improvement in the frequency of the chest pain but still has it. She walks on treadmil daily, 15 min , 3-4 incline, no chest pain. Some shortness of breath at the end of exercise. At times when she had chest pain also had palpitations but nor recurrence of palpitations for about 1 month. She was started on losartan 25 mg qd while in hospital and brouight her BP logs with her. SBP in 140-150s, DBP in mid 80s.     5/28/2024: had nuclear stress test, which showed normal perfusion and LVEF 78%. She has no acute complaints today. She brought BP logs with her: SBP in 110-140s with an average in 120s, and DBP in 60-70s mmHg. Naseem Bonner reports ankle swelling since she started on amlodipine.      Review of Systems denies chest pain, dyspnea on exertion, palpitations, orthopnea, paroxysmal exertional dyspnea or syncope.       SoxHx: never smoker, occasional alcohol intake, denies illicit drug use  Fhx:  mother had pacemaker at age of 87 (not sure of etiology), father had CAD    Review of Systems denies chest pain, dyspnea on exertion, palpitations, orthopnea, paroxysmal exertional dyspnea or syncope.  Admits to lower extremity swelling since starting in amlodipine.     Past history, family history, social history, current medications, vital signs, recent lab and imaging studies and  prior cardiology studies reviewed independently on this visit.    Allergies   Allergen Reactions    Cephalexin Hives    Penicillin G Hives     Throat closes     Current Outpatient Medications:     aspirin 81 mg chewable tablet, Chew 1 tablet (81 mg total) daily, Disp: , Rfl:     chlorthalidone 25 mg tablet, Take 1 tablet (25 mg total) by mouth daily,  Disp: 30 tablet, Rfl: 3    losartan (COZAAR) 25 mg tablet, Take 1 tablet (25 mg total) by mouth daily, Disp: , Rfl:     Cyanocobalamin (Vitamin B-12) 5000 MCG TBDP, Take 1 tablet by mouth in the morning, Disp: , Rfl:     naproxen sodium (ALEVE) 220 MG tablet, Take 220 mg by mouth every 12 (twelve) hours as needed, Disp: , Rfl:     Vitamin D, Cholecalciferol, 25 MCG (1000 UT) TABS, Take 1 tablet by mouth daily, Disp: , Rfl:     Objective:   Vitals:    05/28/24 1506   BP: 158/90   Pulse: 90   SpO2: 96%     Wt Readings from Last 3 Encounters:   05/28/24 83.9 kg (185 lb)   03/27/24 82.6 kg (182 lb)   02/22/24 82 kg (180 lb 12.4 oz)     Pulse Readings from Last 3 Encounters:   05/28/24 90   03/27/24 64   02/22/24 75     BP Readings from Last 3 Encounters:   05/28/24 158/90   03/27/24 148/82   02/22/24 123/60     Physical Exam  General Appearance:    Alert, cooperative, no distress, appears stated age   Head:    atraumatic   Neck:   Supple,  no carotid  bruit or JVD   Lungs:     Clear to auscultation bilaterally, respirations unlabored   Chest wall:    No tenderness or deformity   Heart:    Regular rate and rhythm, S1 and S2 normal, no murmur, rub    or gallop   Abdomen:     Soft, non-tender, no organomegaly   Extremities:   Extremities no cyanosis, trace b/l ankle edema   Skin:   Skin color, texture, turgor normal, no rashes or lesions      Pertinent Laboratory/Diagnostic Studies:    Laboratory studies reviewed personally by Zaina García DO    BMP:   Lab Results   Component Value Date    SODIUM 138 02/22/2024    K 4.1 02/22/2024     02/22/2024    CO2 22 02/22/2024    BUN 19 02/22/2024    CREATININE 0.84 02/22/2024    GLUC 110 02/22/2024    CALCIUM 8.7 02/22/2024     CBC:  Lab Results   Component Value Date    WBC 6.09 02/22/2024    HGB 12.7 02/22/2024    HCT 39.0 02/22/2024    MCV 87 02/22/2024     02/22/2024     Lipid Profile:   Lab Results   Component Value Date    HDL 65 02/22/2024     Lab  "Results   Component Value Date    LDLCALC 123 (H) 02/22/2024     Lab Results   Component Value Date    TRIG 67 02/22/2024      Other labs:  Lab Results   Component Value Date    HMQ1UVJHHVJW 2.888 02/22/2024     Lab Results   Component Value Date    ALT 16 02/21/2024    AST 20 02/21/2024     Imaging Studies:  Pertinent imaging studies and cardiac studies were independently reviewed on this visit and findings summarized.    Zaina García DO  Portions of the record may have been created with voice recognition software.  Occasional wrong word or \"sound alike\" substitutions may have occurred due to the inherent limitations of voice recognition software.  Read the chart carefully and recognize, using context, where substitutions have occurred. Please reach out to me directly for any clarifications.   "

## 2024-11-29 PROBLEM — E78.00 PURE HYPERCHOLESTEROLEMIA: Status: ACTIVE | Noted: 2024-11-29

## 2024-11-29 PROBLEM — I10 PRIMARY HYPERTENSION: Status: ACTIVE | Noted: 2024-02-21

## 2024-11-29 PROBLEM — I44.7 LBBB (LEFT BUNDLE BRANCH BLOCK): Status: ACTIVE | Noted: 2024-11-29

## 2025-05-02 ENCOUNTER — OFFICE VISIT (OUTPATIENT)
Dept: CARDIOLOGY CLINIC | Facility: CLINIC | Age: 80
End: 2025-05-02
Payer: COMMERCIAL

## 2025-05-02 VITALS
HEIGHT: 64 IN | OXYGEN SATURATION: 95 % | BODY MASS INDEX: 30.05 KG/M2 | HEART RATE: 87 BPM | WEIGHT: 176 LBS | DIASTOLIC BLOOD PRESSURE: 74 MMHG | TEMPERATURE: 97.4 F | SYSTOLIC BLOOD PRESSURE: 104 MMHG

## 2025-05-02 DIAGNOSIS — R07.9 CHEST PAIN, UNSPECIFIED TYPE: ICD-10-CM

## 2025-05-02 DIAGNOSIS — E78.00 ELEVATED LDL CHOLESTEROL LEVEL: ICD-10-CM

## 2025-05-02 DIAGNOSIS — I44.7 LBBB (LEFT BUNDLE BRANCH BLOCK): ICD-10-CM

## 2025-05-02 DIAGNOSIS — I10 PRIMARY HYPERTENSION: Primary | ICD-10-CM

## 2025-05-02 PROCEDURE — 99214 OFFICE O/P EST MOD 30 MIN: CPT | Performed by: INTERNAL MEDICINE

## 2025-05-02 PROCEDURE — 93000 ELECTROCARDIOGRAM COMPLETE: CPT | Performed by: INTERNAL MEDICINE

## 2025-05-02 PROCEDURE — G2211 COMPLEX E/M VISIT ADD ON: HCPCS | Performed by: INTERNAL MEDICINE

## 2025-05-02 NOTE — PROGRESS NOTES
[facility] [place]  [address]  [address]  [facility] [address] [phone]    Patient Name: Zuri Gusman  YOB: 1945 ;female  MR No: [id number]        Diagnosis ICD-10-CM Associated Orders   1. Primary hypertension  I10 POCT ECG      2. LBBB (left bundle branch block)  I44.7       3. Chest pain, unspecified type  R07.9       4. Elevated LDL cholesterol level  E78.00            Assessment and recommendations:    1. Primary hypertension  -     POCT ECG  2. LBBB (left bundle branch block)  3. Chest pain, unspecified type  4. Elevated LDL cholesterol level     Blood pressure is actually a little on the low side.  I have advised her to reduce the losartan to 12.5 and as long as the blood pressure stays below 130/80, she can stay on the low-dose.    Her ECG today showed resolution of her left bundle branch block.  Lexiscan-MPI from April 2024 showed no evidence of ischemia or infarction and normal left ankle systolic function.Echocardiogram from February 2024 showed normal left ventricular systolic function with no valvular pathology.  Patient has mildly elevated LDL but no indication to recommend statin therapy.      CHIEF COMPLAINT:      Hypertension, branch block    HPI:   79-year-old female with past medical history significant for chest pain, hypertension, palpitations, left bundle branch block, pure hypercholesterolemia, presents for routine follow-up visit.  She reports no recurrence of chest pain or palpitations.  She states that her blood pressure generally stays in the 120s over 70s range but occasionally even lower than that and she gets a little weak and dizzy when she takes the losartan in the evening.  She denies any syncope.    Past Medical History:   Diagnosis Date    Endometrial cancer (HCC)           CURRENT  MEDICATIONS:      Current Outpatient Medications:     aspirin 81 mg chewable tablet, Chew 1 tablet (81 mg total) daily,  Disp: , Rfl:     chlorthalidone 25 mg tablet, Take 1 tablet (25 mg total) by mouth daily, Disp: 30 tablet, Rfl: 3    Cyanocobalamin (Vitamin B-12) 5000 MCG TBDP, Take 1 tablet by mouth in the morning, Disp: , Rfl:     losartan (COZAAR) 25 mg tablet, Take 1 tablet (25 mg total) by mouth daily, Disp: , Rfl:     naproxen sodium (ALEVE) 220 MG tablet, Take 220 mg by mouth every 12 (twelve) hours as needed, Disp: , Rfl:     Vitamin D, Cholecalciferol, 25 MCG (1000 UT) TABS, Take 1 tablet by mouth daily, Disp: , Rfl:     ALLERGIES  Allergies   Allergen Reactions    Cephalexin Hives    Penicillin G Hives     Throat closes       Lab Results   Component Value Date    LDLCALC 123 (H) 02/22/2024    HDL 65 02/22/2024    CHOLESTEROL 201 (H) 02/22/2024    TRIG 67 02/22/2024    CREATININE 0.84 02/22/2024    CREATININE 1.03 02/21/2024    K 4.1 02/22/2024    K 4.1 02/21/2024    SODIUM 138 02/22/2024    SODIUM 140 02/21/2024       I have personally reviewed the most recent ECG from today which showed normal sinus rhythm, minimal voltage criteria for LVH and poor R wave progression but no left bundle branch block.      REVIEW OF SYSTEMS   Positive for: Arthritis, occasional dizziness and weakness  Negative for: All remaining as reviewed below and in HPI.    SYSTEM SYMPTOMS REVIEWED:  General--weight change, fever, night sweats  Respiratory--cough, wheezing, shortness of breath, sputum production  Cardiovascular--chest pain, syncope, dyspnea on exertion, edema, decline in exercise tolerance, claudication   Gastrointestinal--persistent vomiting, diarrhea, abdominal distention, blood in stool   Muscular or skeletal--joint pain or swelling   Neurologic--headaches, syncope, abnormal movement  Hematologic--history of easy bruising and bleeding   Endocrine--thyroid enlargement, heat or cold intolerance, polyuria   Psychiatric--anxiety, depression     General physical examination:    General appearance: Alert, no acute distress, appears  "stated age, mild obesity.  HEENT: Mucous membranes are moist.  No obvious abnormality noted.  Neck: Supple with no lymphadenopathy.  No JVD.  Carotid pulses are intact.  No carotid bruit.  Cardiovascular system: Regular rhythm.  Normal S1 and S2.  No murmurs.  No rubs or gallops. Extremities: No edema. No cyanosis.  Pulmonary: Respirations unlabored.  Good air entry bilaterally.  Clear to auscultation bilaterally.  Gastrointestinal: Abdomen is soft and nontender.  Bowel sounds are positive.  Musculoskeletal: Upper Extremities: Normal upper motor strength. Lower Extremity: Normal motor strength. Gait: Normal.   Skin: Skin is warm. No rashes or lesions.  Neurological: Patient is alert and oriented with no gross motor deficits.  Psychiatric: Mood is normal.  Behavior is normal.    Vitals:    05/02/25 1346   BP: 104/74   Pulse: 87   Temp: (!) 97.4 °F (36.3 °C)   SpO2: 95%      Body mass index is 30.21 kg/m².  Wt Readings from Last 3 Encounters:   05/02/25 79.8 kg (176 lb)   05/28/24 83.9 kg (185 lb)   03/27/24 82.6 kg (182 lb)             Jorge Chapman MD, FACC, ABENA    Portions of the record  have been created with voice recognition software.  Occasional grammatical mistakes or wrong word or \"sound alike\" substitutions may have occurred due to the inherent limitations of voice recognition software. Please reach out to me directly for any clarifications.   "